# Patient Record
Sex: MALE | Race: WHITE
[De-identification: names, ages, dates, MRNs, and addresses within clinical notes are randomized per-mention and may not be internally consistent; named-entity substitution may affect disease eponyms.]

---

## 2020-04-12 ENCOUNTER — HOSPITAL ENCOUNTER (INPATIENT)
Dept: HOSPITAL 47 - EC | Age: 60
LOS: 3 days | Discharge: HOME | DRG: 871 | End: 2020-04-15
Attending: INTERNAL MEDICINE | Admitting: INTERNAL MEDICINE
Payer: COMMERCIAL

## 2020-04-12 VITALS — BODY MASS INDEX: 27.1 KG/M2

## 2020-04-12 DIAGNOSIS — C85.99: ICD-10-CM

## 2020-04-12 DIAGNOSIS — N20.0: ICD-10-CM

## 2020-04-12 DIAGNOSIS — E87.1: ICD-10-CM

## 2020-04-12 DIAGNOSIS — N39.0: ICD-10-CM

## 2020-04-12 DIAGNOSIS — Z87.891: ICD-10-CM

## 2020-04-12 DIAGNOSIS — Z16.29: ICD-10-CM

## 2020-04-12 DIAGNOSIS — Z20.828: ICD-10-CM

## 2020-04-12 DIAGNOSIS — K83.09: ICD-10-CM

## 2020-04-12 DIAGNOSIS — Z85.841: ICD-10-CM

## 2020-04-12 DIAGNOSIS — E78.5: ICD-10-CM

## 2020-04-12 DIAGNOSIS — K71.6: ICD-10-CM

## 2020-04-12 DIAGNOSIS — E11.65: ICD-10-CM

## 2020-04-12 DIAGNOSIS — G93.6: ICD-10-CM

## 2020-04-12 DIAGNOSIS — E83.42: ICD-10-CM

## 2020-04-12 DIAGNOSIS — K21.9: ICD-10-CM

## 2020-04-12 DIAGNOSIS — A41.9: Primary | ICD-10-CM

## 2020-04-12 DIAGNOSIS — E87.2: ICD-10-CM

## 2020-04-12 DIAGNOSIS — T50.905A: ICD-10-CM

## 2020-04-12 DIAGNOSIS — B95.62: ICD-10-CM

## 2020-04-12 DIAGNOSIS — Z79.899: ICD-10-CM

## 2020-04-12 DIAGNOSIS — I10: ICD-10-CM

## 2020-04-12 DIAGNOSIS — Z92.21: ICD-10-CM

## 2020-04-12 LAB
ALBUMIN SERPL-MCNC: 3.4 G/DL (ref 3.5–5)
ALP SERPL-CCNC: 417 U/L (ref 38–126)
ALT SERPL-CCNC: 1056 U/L (ref 4–49)
ANION GAP SERPL CALC-SCNC: 7 MMOL/L
APTT BLD: 20.9 SEC (ref 22–30)
AST SERPL-CCNC: 505 U/L (ref 17–59)
BUN SERPL-SCNC: 13 MG/DL (ref 9–20)
CALCIUM SPEC-MCNC: 9.5 MG/DL (ref 8.4–10.2)
CELLS COUNTED: 200
CHLORIDE SERPL-SCNC: 97 MMOL/L (ref 98–107)
CO2 SERPL-SCNC: 25 MMOL/L (ref 22–30)
D DIMER PPP FEU-MCNC: 1.46 MG/L FEU (ref ?–0.6)
EOSINOPHIL # BLD MANUAL: 0.07 K/UL (ref 0–0.7)
ERYTHROCYTE [DISTWIDTH] IN BLOOD BY AUTOMATED COUNT: 4.2 M/UL (ref 4.3–5.9)
ERYTHROCYTE [DISTWIDTH] IN BLOOD: 13.5 % (ref 11.5–15.5)
GLUCOSE BLD-MCNC: 301 MG/DL (ref 75–99)
GLUCOSE SERPL-MCNC: 315 MG/DL (ref 74–99)
HCT VFR BLD AUTO: 39.7 % (ref 39–53)
HEPATITIS A ANTIBODY IGM: NEGATIVE
HGB BLD-MCNC: 13.3 GM/DL (ref 13–17.5)
INR PPP: 0.9 (ref ?–1.2)
LDH SPEC-CCNC: 1554 U/L (ref 313–618)
LYMPHOCYTES # BLD MANUAL: 2.41 K/UL (ref 1–4.8)
MAGNESIUM SPEC-SCNC: 1.5 MG/DL (ref 1.6–2.3)
MCH RBC QN AUTO: 31.6 PG (ref 25–35)
MCHC RBC AUTO-ENTMCNC: 33.5 G/DL (ref 31–37)
MCV RBC AUTO: 94.4 FL (ref 80–100)
METAMYELOCYTES # BLD: 0.26 K/UL
MONOCYTES # BLD MANUAL: 0.78 K/UL (ref 0–1)
MYELOCYTES # BLD MANUAL: 0.2 K/UL
NEUTROPHILS NFR BLD MANUAL: 42 %
NEUTS SEG # BLD MANUAL: 2.9 K/UL (ref 1.3–7.7)
PLATELET # BLD AUTO: 344 K/UL (ref 150–450)
POTASSIUM SERPL-SCNC: 4 MMOL/L (ref 3.5–5.1)
PROT SERPL-MCNC: 6.2 G/DL (ref 6.3–8.2)
PT BLD: 9.3 SEC (ref 9–12)
SODIUM SERPL-SCNC: 129 MMOL/L (ref 137–145)
WBC # BLD AUTO: 6.5 K/UL (ref 3.8–10.6)

## 2020-04-12 PROCEDURE — 87635 SARS-COV-2 COVID-19 AMP PRB: CPT

## 2020-04-12 PROCEDURE — 80053 COMPREHEN METABOLIC PANEL: CPT

## 2020-04-12 PROCEDURE — 87040 BLOOD CULTURE FOR BACTERIA: CPT

## 2020-04-12 PROCEDURE — 80074 ACUTE HEPATITIS PANEL: CPT

## 2020-04-12 PROCEDURE — 93005 ELECTROCARDIOGRAM TRACING: CPT

## 2020-04-12 PROCEDURE — 81001 URINALYSIS AUTO W/SCOPE: CPT

## 2020-04-12 PROCEDURE — 85610 PROTHROMBIN TIME: CPT

## 2020-04-12 PROCEDURE — 87077 CULTURE AEROBIC IDENTIFY: CPT

## 2020-04-12 PROCEDURE — 96365 THER/PROPH/DIAG IV INF INIT: CPT

## 2020-04-12 PROCEDURE — 86140 C-REACTIVE PROTEIN: CPT

## 2020-04-12 PROCEDURE — 36415 COLL VENOUS BLD VENIPUNCTURE: CPT

## 2020-04-12 PROCEDURE — 82150 ASSAY OF AMYLASE: CPT

## 2020-04-12 PROCEDURE — 71045 X-RAY EXAM CHEST 1 VIEW: CPT

## 2020-04-12 PROCEDURE — 80299 QUANTITATIVE ASSAY DRUG: CPT

## 2020-04-12 PROCEDURE — 87086 URINE CULTURE/COLONY COUNT: CPT

## 2020-04-12 PROCEDURE — 82728 ASSAY OF FERRITIN: CPT

## 2020-04-12 PROCEDURE — 74176 CT ABD & PELVIS W/O CONTRAST: CPT

## 2020-04-12 PROCEDURE — 82009 KETONE BODYS QUAL: CPT

## 2020-04-12 PROCEDURE — 99285 EMERGENCY DEPT VISIT HI MDM: CPT

## 2020-04-12 PROCEDURE — 71275 CT ANGIOGRAPHY CHEST: CPT

## 2020-04-12 PROCEDURE — 76705 ECHO EXAM OF ABDOMEN: CPT

## 2020-04-12 PROCEDURE — 85730 THROMBOPLASTIN TIME PARTIAL: CPT

## 2020-04-12 PROCEDURE — 85379 FIBRIN DEGRADATION QUANT: CPT

## 2020-04-12 PROCEDURE — 84145 PROCALCITONIN (PCT): CPT

## 2020-04-12 PROCEDURE — 96361 HYDRATE IV INFUSION ADD-ON: CPT

## 2020-04-12 PROCEDURE — 86665 EPSTEIN-BARR CAPSID VCA: CPT

## 2020-04-12 PROCEDURE — 84550 ASSAY OF BLOOD/URIC ACID: CPT

## 2020-04-12 PROCEDURE — 70450 CT HEAD/BRAIN W/O DYE: CPT

## 2020-04-12 PROCEDURE — 83690 ASSAY OF LIPASE: CPT

## 2020-04-12 PROCEDURE — 93306 TTE W/DOPPLER COMPLETE: CPT

## 2020-04-12 PROCEDURE — 85025 COMPLETE CBC W/AUTO DIFF WBC: CPT

## 2020-04-12 PROCEDURE — 84484 ASSAY OF TROPONIN QUANT: CPT

## 2020-04-12 PROCEDURE — 87502 INFLUENZA DNA AMP PROBE: CPT

## 2020-04-12 PROCEDURE — 86645 CMV ANTIBODY IGM: CPT

## 2020-04-12 PROCEDURE — 87186 SC STD MICRODIL/AGAR DIL: CPT

## 2020-04-12 PROCEDURE — 83615 LACTATE (LD) (LDH) ENZYME: CPT

## 2020-04-12 PROCEDURE — 83735 ASSAY OF MAGNESIUM: CPT

## 2020-04-12 PROCEDURE — 83605 ASSAY OF LACTIC ACID: CPT

## 2020-04-12 RX ADMIN — INSULIN ASPART SCH UNIT: 100 INJECTION, SOLUTION INTRAVENOUS; SUBCUTANEOUS at 20:59

## 2020-04-12 RX ADMIN — DEXAMETHASONE SCH MG: 4 TABLET ORAL at 19:52

## 2020-04-12 RX ADMIN — FLUTICASONE PROPIONATE PRN SPRAY: 50 SPRAY, METERED NASAL at 21:42

## 2020-04-12 NOTE — P.CONS
History of Present Illness





- Reason for Consult


Consult date: 04/12/20


NHL


Requesting physician: Yung More





- Chief Complaint


Labile, Weakness, Fever





- History of Present Illness





Mr. Pires is a pleasant male who was recently admitted to [facility] 

with mental status changes, weakness, and inability to speak. He was found to 

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 

with Primary CNS Lymphoma. I did attempt to speak with Delano today although 

pleasant he is a poor historian and unable to tell me what was going on, why he 

was in hospital, and what events had occurred over the past few months. 

THerefore I spoke in depth with his wife Drea. On admission low grade fevers, 

current pan cultures are negative. He apparently was treated with HIgh Dose 

Methotrexate, and is due for cycle number two on 4/15/20. He was suppose to meet

Dr. Bryant as outpatient this week to resume care closer to home. On admission 

his Liver function is increased. CTA negative for PE. 





Review of Systems





A 14 point review of systems assessed and completed and all negative except HPI 

- Patient poor historian most of this information was gathered from wife





Past Medical History


Past Medical History: Cancer, Diabetes Mellitus


Additional Past Medical History / Comment(s): BRAIN CANCER


History of Any Multi-Drug Resistant Organisms: None Reported


Additional Past Surgical History / Comment(s): PORT


Past Psychological History: No Psychological Hx Reported


Smoking Status: Former smoker


Past Alcohol Use History: Occasional


Past Drug Use History: None Reported





- Past Family History


  ** Father


Additional Family Medical History / Comment(s): unknown





  ** Mother


Additional Family Medical History / Comment(s): unknown





Medications and Allergies


                                Home Medications











 Medication  Instructions  Recorded  Confirmed  Type


 


Allopurinol [Zyloprim] 300 mg PO DAILY 04/12/20 04/12/20 History


 


Citalopram Hydrobromide [CeleXA] 20 mg PO DAILY 04/12/20 04/12/20 History


 


Dexamethasone 4 mg PO DAILY 04/12/20 04/12/20 History


 


Docusate Sodium [Dok] 100 mg PO BID 04/12/20 04/12/20 History


 


Lacosamide [Vimpat] 100 mg PO BID 04/12/20 04/12/20 History


 


QUEtiapine [SEROquel] 50 mg PO HS 04/12/20 04/12/20 History








                                    Allergies











Allergy/AdvReac Type Severity Reaction Status Date / Time


 


No Known Allergies Allergy   Verified 04/12/20 12:33














Physical Exam


Vitals: 


                                   Vital Signs











  Temp Pulse Resp BP Pulse Ox


 


 04/12/20 14:48  98.8 F  71  16  134/74  96


 


 04/12/20 13:00   80  16  133/71  96


 


 04/12/20 11:00   70  18  123/61  95


 


 04/12/20 10:08  99.2 F  65  18  120/63  97


 


 04/12/20 08:40   72  18  151/80  98


 


 04/12/20 07:57  100.2 F H  74  18  141/80  97








                                Intake and Output











 04/12/20 04/12/20 04/12/20





 06:59 14:59 22:59


 


Other:   


 


  Weight  99.79 kg 














Telemed visit, patient seen on RN facetime although pleasant unable to provide 

info





Results


CBC & Chem 7: 


                                 04/12/20 08:25





                                 04/12/20 08:25


Labs: 


                  Abnormal Lab Results - Last 24 Hours (Table)











  04/12/20 04/12/20 04/12/20 Range/Units





  08:25 08:25 08:25 


 


RBC  4.20 L    (4.30-5.90)  m/uL


 


Metamyelocytes # (Man)  0.26 H    (0)  k/uL


 


Myelocytes # (Manual)  0.20 H    (0)  k/uL


 


Nucleated RBCs  1 H    (0-0)  /100 WBC


 


APTT   20.9 L   (22.0-30.0)  sec


 


D-Dimer   1.46 H   (<0.60)  mg/L FEU


 


Sodium    129 L  (137-145)  mmol/L


 


Chloride    97 L  ()  mmol/L


 


Glucose    315 H  (74-99)  mg/dL


 


Plasma Lactic Acid Chauncey     (0.7-2.0)  mmol/L


 


Magnesium    1.5 L  (1.6-2.3)  mg/dL


 


AST    505 H  (17-59)  U/L


 


ALT    1056 H  (4-49)  U/L


 


Alkaline Phosphatase    417 H  ()  U/L


 


Lactate Dehydrogenase    1554 H  (313-618)  U/L


 


C-Reactive Protein    28.2 H  (<10.0)  mg/L


 


Total Protein    6.2 L  (6.3-8.2)  g/dL


 


Albumin    3.4 L  (3.5-5.0)  g/dL














  04/12/20 04/12/20 Range/Units





  08:25 13:07 


 


RBC    (4.30-5.90)  m/uL


 


Metamyelocytes # (Man)    (0)  k/uL


 


Myelocytes # (Manual)    (0)  k/uL


 


Nucleated RBCs    (0-0)  /100 WBC


 


APTT    (22.0-30.0)  sec


 


D-Dimer    (<0.60)  mg/L FEU


 


Sodium    (137-145)  mmol/L


 


Chloride    ()  mmol/L


 


Glucose    (74-99)  mg/dL


 


Plasma Lactic Acid Chauncey  3.1 H*  2.4 H*  (0.7-2.0)  mmol/L


 


Magnesium    (1.6-2.3)  mg/dL


 


AST    (17-59)  U/L


 


ALT    (4-49)  U/L


 


Alkaline Phosphatase    ()  U/L


 


Lactate Dehydrogenase    (313-618)  U/L


 


C-Reactive Protein    (<10.0)  mg/L


 


Total Protein    (6.3-8.2)  g/dL


 


Albumin    (3.5-5.0)  g/dL











Chest x-ray: report reviewed


CT scan - chest: report reviewed





Assessment and Plan


Plan: 





Assessment and Recommendations:





Recent Diagnosis of CNS Lymphoma:


 - Status POst one cycle of chemotherapy at [facility] and apparently due

 on 4/15/20. Was to have follow-up with Dr. Bryant this week to resume care 

closer to home. Information provided from eric Kuo, possible treatment was 

High DOse Methotrexate (?) will request records


 - Check Methotrexate level





Fevers:


 - Work-up in progress


 - Low grade in Emergency, possible related to tumor and chemo, must rule out 

infectious nature first





Elevated LFTs:


 - Medication versus other


 - Imaging of liver ordered





Telemed visit today, most of time spent collecting information from wife drea.

 Visit was given with permission from patient, history, medications, pertinent 

labs and orderes reviewed and placed.  


Time with Patient: Greater than 30

## 2020-04-12 NOTE — P.PN
Progress Note - Text


Progress Note Date: 04/12/20





Spoke to patient's wife, Shiela. She was able to clarify history and recent 

events better. Delano has a primary CNS lymphoma and was recently at [facility] where he received his first cycle of what is believed to be High Dose 

Methotrexate. His baseline recently is poor historian, labile emotions and 

intermittent confusion. He originally presented with quick onset inability to 

speak or walk, this is when the brain tumor was found. This was Early March per 

his wife. She denies any history of liver disease. No excessive history of 

alcohol, social per wife. Has not smoked since diagnosed. He was suppose to 

transfer care to Dr. Bryant as he is due for next round of chemotherapy on April 15th. 





Increased LFTs


Hyponatremia, Hypomagnesia


Increased Temp: Tumor versus infectious





Pan CUlture


Abdominal imaging focus liver


Methotrexate level stat


Uric acid and Phos levels





Monitor for tumor lysis and work-up of increased liver function. 





Full consult to follow





Marisel Perkins NP

## 2020-04-12 NOTE — ED
General Adult HPI





- General


Chief complaint: Fever


Stated complaint: Weakness/Poss Covid


Time Seen by Provider: 04/12/20 08:00


Source: patient, RN notes reviewed, old records reviewed


Mode of arrival: EMS





- History of Present Illness


Initial comments: 





This is a 60-year-old male who presents to the emergency department and is a 

very poor historian but he is alert and oriented 3.  Patient states he was in 

the hospital at [facility] about 2 months ago he thinks he was in the 

hospital for 6 weeks but he is not exactly sure why he was in the hospital or 

what symptoms brought him to the hospital or what his final diagnosis was other 

then he was diagnosed with non-Hodgkin's lymphoma.  Patient states nobody will 

tell him the truth his brothers or girlfriend for some reason or keeping the 

information from him so he will not tell me why.  Patient states he's here today

because he started having some achiness just a low-grade fever and he decided 

come to the emergency department to be evaluated.  Patient states he is supposed

to be treated locally for his non-Hodgkin's lymphoma and has an appointment 

tomorrow.  Patient states he's had no shortness of breath or difficulty 

breathing easily no conjunctival symptoms.  Patient denies any chest pain or 

palpitations.  Patient denies abdominal pain patient denies any nausea vomiting 

diarrhea per patient denies any rashes.  Patient again does not remember 

virtually anything from his 6 week hospital stay at home on [place].





- Related Data


                                Home Medications











 Medication  Instructions  Recorded  Confirmed


 


Allopurinol [Zyloprim] 300 mg PO DAILY 04/12/20 04/12/20


 


Citalopram Hydrobromide [CeleXA] 20 mg PO DAILY 04/12/20 04/12/20


 


Dexamethasone 4 mg PO DAILY 04/12/20 04/12/20


 


Docusate Sodium [Dok] 100 mg PO BID 04/12/20 04/12/20


 


Lacosamide [Vimpat] 100 mg PO BID 04/12/20 04/12/20


 


QUEtiapine [SEROquel] 50 mg PO HS 04/12/20 04/12/20











                                    Allergies











Allergy/AdvReac Type Severity Reaction Status Date / Time


 


No Known Allergies Allergy   Verified 04/12/20 12:33














Review of Systems


ROS Statement: 


Those systems with pertinent positive or pertinent negative responses have been 

documented in the HPI.





ROS Other: All systems not noted in ROS Statement are negative.





Past Medical History


Past Medical History: Cancer, Diabetes Mellitus


Additional Past Medical History / Comment(s): BRAIN CANCER


History of Any Multi-Drug Resistant Organisms: None Reported


Additional Past Surgical History / Comment(s): PORT


Past Psychological History: No Psychological Hx Reported


Smoking Status: Former smoker


Past Alcohol Use History: Occasional


Past Drug Use History: None Reported





General Exam





- General Exam Comments


Initial Comments: 





GENERAL:


Patient is well-developed and well-nourished.  Patient is nontoxic and well-

hydrated and is in no apparent distress.





ENT:


Neck is soft and supple.  No significant lymphadenopathy is noted.  Oropharynx 

is clear.  Moist mucous membranes.  Neck has full range of motion without 

eliciting any pain.  





EYES:


The sclera were anicteric and conjunctiva were pink and moist.  Extraocular 

movements were intact and pupils were equal round and reactive to light.  

Eyelids were unremarkable.





PULMONARY:


Patient had some minimal crackles in the left base





CARDIOVASCULAR:


There is a regular rate and rhythm without any murmurs gallops or rubs. 





ABDOMEN:


Soft and nontender with normal bowel sounds.   





SKIN:


Skin is clear with no lesions or rashes and otherwise unremarkable.





NEUROLOGIC:


Patient is alert and oriented x3.  Cranial nerves II through XII are grossly 

intact.  Motor and sensory are also intact.  Normal speech, volume and content. 

 Symmetrical smile.  





MUSCULOSKELETAL:


Normal extremities with adequate strength and full range of motion. 





LYMPHATICS:


No significant lymphadenopathy is noted





PSYCHIATRIC:


Normal psychiatric evaluation.  


Limitations: no limitations





Course


                                   Vital Signs











  04/12/20 04/12/20 04/12/20





  07:57 08:40 10:08


 


Temperature 100.2 F H  99.2 F


 


Pulse Rate 74 72 65


 


Respiratory 18 18 18





Rate   


 


Blood Pressure 141/80 151/80 120/63


 


O2 Sat by Pulse 97 98 97





Oximetry   














  04/12/20 04/12/20





  11:00 13:00


 


Temperature  


 


Pulse Rate 70 80


 


Respiratory 18 16





Rate  


 


Blood Pressure 123/61 133/71


 


O2 Sat by Pulse 95 96





Oximetry  














Medical Decision Making





- Medical Decision Making





EKG shows normal sinus rhythm at 74 bpm ME interval 162 QRS is 90 QT interval 38

 QTC is 4:30.  Patient's EKG shows no ST segment elevation or depression. 





Patient was given magnesium emergency department.  Patient was also given 

fluids.





I spoke with Dr. Schneider agrees to admit the patient I wrote admitting orders.





- Lab Data


Result diagrams: 


                                 04/12/20 08:25





                                 04/12/20 08:25


                                   Lab Results











  04/12/20 04/12/20 04/12/20 Range/Units





  08:18 08:25 08:25 


 


WBC   6.5   (3.8-10.6)  k/uL


 


RBC   4.20 L   (4.30-5.90)  m/uL


 


Hgb   13.3   (13.0-17.5)  gm/dL


 


Hct   39.7   (39.0-53.0)  %


 


MCV   94.4   (80.0-100.0)  fL


 


MCH   31.6   (25.0-35.0)  pg


 


MCHC   33.5   (31.0-37.0)  g/dL


 


RDW   13.5   (11.5-15.5)  %


 


Plt Count   344   (150-450)  k/uL


 


Neutrophils % (Manual)   42   %


 


Band Neutrophils %   3   %


 


Lymphocytes % (Manual)   37   %


 


Monocytes % (Manual)   12   %


 


Eosinophils % (Manual)   1   %


 


Metamyelocytes %   4   %


 


Myelocytes %   3   %


 


Neutrophils # (Manual)   2.90   (1.3-7.7)  k/uL


 


Lymphocytes # (Manual)   2.41   (1.0-4.8)  k/uL


 


Monocytes # (Manual)   0.78   (0-1.0)  k/uL


 


Eosinophils # (Manual)   0.07   (0-0.7)  k/uL


 


Metamyelocytes # (Man)   0.26 H   (0)  k/uL


 


Myelocytes # (Manual)   0.20 H   (0)  k/uL


 


Nucleated RBCs   1 H   (0-0)  /100 WBC


 


Manual Slide Review   Performed   


 


PT    9.3  (9.0-12.0)  sec


 


INR    0.9  (<1.2)  


 


APTT    20.9 L  (22.0-30.0)  sec


 


D-Dimer    1.46 H  (<0.60)  mg/L FEU


 


Sodium     (137-145)  mmol/L


 


Potassium     (3.5-5.1)  mmol/L


 


Chloride     ()  mmol/L


 


Carbon Dioxide     (22-30)  mmol/L


 


Anion Gap     mmol/L


 


BUN     (9-20)  mg/dL


 


Creatinine     (0.66-1.25)  mg/dL


 


Est GFR (CKD-EPI)AfAm     (>60 ml/min/1.73 sqM)  


 


Est GFR (CKD-EPI)NonAf     (>60 ml/min/1.73 sqM)  


 


Glucose     (74-99)  mg/dL


 


Lactic Ac Sepsis Rflx     


 


Plasma Lactic Acid Chauncey     (0.7-2.0)  mmol/L


 


Calcium     (8.4-10.2)  mg/dL


 


Magnesium     (1.6-2.3)  mg/dL


 


Total Bilirubin     (0.2-1.3)  mg/dL


 


AST     (17-59)  U/L


 


ALT     (4-49)  U/L


 


Alkaline Phosphatase     ()  U/L


 


Lactate Dehydrogenase     (313-618)  U/L


 


C-Reactive Protein     (<10.0)  mg/L


 


Total Protein     (6.3-8.2)  g/dL


 


Albumin     (3.5-5.0)  g/dL


 


Coronavirus (PCR)  Not Detected    (Not Detectd)  


 


Hepatitis A IgM Ab     


 


Influenza Type A RNA  Not Detected    (Not Detectd)  


 


Influenza Type B (PCR)  Not Detected    (Not Detectd)  














  04/12/20 04/12/20 04/12/20 Range/Units





  08:25 08:25 09:07 


 


WBC     (3.8-10.6)  k/uL


 


RBC     (4.30-5.90)  m/uL


 


Hgb     (13.0-17.5)  gm/dL


 


Hct     (39.0-53.0)  %


 


MCV     (80.0-100.0)  fL


 


MCH     (25.0-35.0)  pg


 


MCHC     (31.0-37.0)  g/dL


 


RDW     (11.5-15.5)  %


 


Plt Count     (150-450)  k/uL


 


Neutrophils % (Manual)     %


 


Band Neutrophils %     %


 


Lymphocytes % (Manual)     %


 


Monocytes % (Manual)     %


 


Eosinophils % (Manual)     %


 


Metamyelocytes %     %


 


Myelocytes %     %


 


Neutrophils # (Manual)     (1.3-7.7)  k/uL


 


Lymphocytes # (Manual)     (1.0-4.8)  k/uL


 


Monocytes # (Manual)     (0-1.0)  k/uL


 


Eosinophils # (Manual)     (0-0.7)  k/uL


 


Metamyelocytes # (Man)     (0)  k/uL


 


Myelocytes # (Manual)     (0)  k/uL


 


Nucleated RBCs     (0-0)  /100 WBC


 


Manual Slide Review     


 


PT     (9.0-12.0)  sec


 


INR     (<1.2)  


 


APTT     (22.0-30.0)  sec


 


D-Dimer     (<0.60)  mg/L FEU


 


Sodium  129 L    (137-145)  mmol/L


 


Potassium  4.0    (3.5-5.1)  mmol/L


 


Chloride  97 L    ()  mmol/L


 


Carbon Dioxide  25    (22-30)  mmol/L


 


Anion Gap  7    mmol/L


 


BUN  13    (9-20)  mg/dL


 


Creatinine  0.79    (0.66-1.25)  mg/dL


 


Est GFR (CKD-EPI)AfAm  >90    (>60 ml/min/1.73 sqM)  


 


Est GFR (CKD-EPI)NonAf  >90    (>60 ml/min/1.73 sqM)  


 


Glucose  315 H    (74-99)  mg/dL


 


Lactic Ac Sepsis Rflx    Y  


 


Plasma Lactic Acid Chauncey   3.1 H*   (0.7-2.0)  mmol/L


 


Calcium  9.5    (8.4-10.2)  mg/dL


 


Magnesium  1.5 L    (1.6-2.3)  mg/dL


 


Total Bilirubin  0.3    (0.2-1.3)  mg/dL


 


AST  505 H    (17-59)  U/L


 


ALT  1056 H    (4-49)  U/L


 


Alkaline Phosphatase  417 H    ()  U/L


 


Lactate Dehydrogenase  1554 H    (313-618)  U/L


 


C-Reactive Protein  28.2 H    (<10.0)  mg/L


 


Total Protein  6.2 L    (6.3-8.2)  g/dL


 


Albumin  3.4 L    (3.5-5.0)  g/dL


 


Coronavirus (PCR)     (Not Detectd)  


 


Hepatitis A IgM Ab     


 


Influenza Type A RNA     (Not Detectd)  


 


Influenza Type B (PCR)     (Not Detectd)  














  04/12/20 04/12/20 Range/Units





  12:12 13:07 


 


WBC    (3.8-10.6)  k/uL


 


RBC    (4.30-5.90)  m/uL


 


Hgb    (13.0-17.5)  gm/dL


 


Hct    (39.0-53.0)  %


 


MCV    (80.0-100.0)  fL


 


MCH    (25.0-35.0)  pg


 


MCHC    (31.0-37.0)  g/dL


 


RDW    (11.5-15.5)  %


 


Plt Count    (150-450)  k/uL


 


Neutrophils % (Manual)    %


 


Band Neutrophils %    %


 


Lymphocytes % (Manual)    %


 


Monocytes % (Manual)    %


 


Eosinophils % (Manual)    %


 


Metamyelocytes %    %


 


Myelocytes %    %


 


Neutrophils # (Manual)    (1.3-7.7)  k/uL


 


Lymphocytes # (Manual)    (1.0-4.8)  k/uL


 


Monocytes # (Manual)    (0-1.0)  k/uL


 


Eosinophils # (Manual)    (0-0.7)  k/uL


 


Metamyelocytes # (Man)    (0)  k/uL


 


Myelocytes # (Manual)    (0)  k/uL


 


Nucleated RBCs    (0-0)  /100 WBC


 


Manual Slide Review    


 


PT    (9.0-12.0)  sec


 


INR    (<1.2)  


 


APTT    (22.0-30.0)  sec


 


D-Dimer    (<0.60)  mg/L FEU


 


Sodium    (137-145)  mmol/L


 


Potassium    (3.5-5.1)  mmol/L


 


Chloride    ()  mmol/L


 


Carbon Dioxide    (22-30)  mmol/L


 


Anion Gap    mmol/L


 


BUN    (9-20)  mg/dL


 


Creatinine    (0.66-1.25)  mg/dL


 


Est GFR (CKD-EPI)AfAm    (>60 ml/min/1.73 sqM)  


 


Est GFR (CKD-EPI)NonAf    (>60 ml/min/1.73 sqM)  


 


Glucose    (74-99)  mg/dL


 


Lactic Ac Sepsis Rflx    


 


Plasma Lactic Acid Chauncey   2.4 H*  (0.7-2.0)  mmol/L


 


Calcium    (8.4-10.2)  mg/dL


 


Magnesium    (1.6-2.3)  mg/dL


 


Total Bilirubin    (0.2-1.3)  mg/dL


 


AST    (17-59)  U/L


 


ALT    (4-49)  U/L


 


Alkaline Phosphatase    ()  U/L


 


Lactate Dehydrogenase    (313-618)  U/L


 


C-Reactive Protein    (<10.0)  mg/L


 


Total Protein    (6.3-8.2)  g/dL


 


Albumin    (3.5-5.0)  g/dL


 


Coronavirus (PCR)    (Not Detectd)  


 


Hepatitis A IgM Ab  NEGATIVE   


 


Influenza Type A RNA    (Not Detectd)  


 


Influenza Type B (PCR)    (Not Detectd)  














Disposition


Clinical Impression: 


 Fever of unknown origin, Hyponatremia, Hypomagnesemia, Lactic acidosis, 

Elevated liver enzymes





Disposition: ADMITTED AS IP TO THIS HOSP


Referrals: 


Nonstaff,Physician [Primary Care Provider] - 1-2 days


Time of Disposition: 14:37

## 2020-04-12 NOTE — HP
HISTORY AND PHYSICAL



DATE OF SERVICE:

04/12/2020.



CHIEF COMPLAINT:

Fever.



HISTORY OF PRESENT ILLNESS:

This 60-year-old gentleman who was recently diagnosed with non-Hodgkin lymphoma on the

right side of the brain at [facility] after biopsy, also had significant

difficulty with the left arm.  The patient had CNS lymphoma and the patient was started

apparently on chemotherapy.  The patient was running a mild fever and the patient was

taken to [facility] and was admitted for further evaluation and treatment.

Prior to the initial episode of lymphoma, [place]

subsequently appeared in [place], not able to remember.  Patient apparently does not

remember what happened for the last couple months.  The fever was low-grade and the

patient was recently discharged from [facility] and occasional cough is

reported.  Otherwise, no shortness of breath, no chest pain or palpitations.  Patient

came to [facility] and was admitted for further evaluation and treatment.  The

evaluation in the ER showed multiple abnormalities including normal CBC, but however, D-

dimer was elevated 1.4.  Sodium 129.  Lactic acid also elevated and AST/ALT was

significantly elevated at 506 and 1056 indicating hepatitis inflammatory process.  C-

reactive protein also increased.  Corona virus testing was negative.  Hepatitis panel

is also negative.  The chest CTA was also done because of the elevated D-dimer, which

showed no evidence of any pulmonary embolism, DJD of the spine was noted.  A detailed

history could not be taken from the patient because of memory gaps.  Most of the

history is taken from my discussion with staff who discussed with the wife at length.



PAST MEDICAL HISTORY:

Past medical history of recently diagnosed CNS lymphoma and brain biopsy, history of

diabetes type 2, GERD, hypertension, history of [employer].



MEDICATIONS:

Prior to admission:

1. Vimpat 100 mg p.o. b.i.d.

2. Docusate sodium 100 mg p.o. b.i.d.

3. Seroquel 50 mg q.h.s.

4. Zyloprim 300 mg daily.

5. Dexamethasone 4 mg p.o. daily.

6. Celexa 20 mg daily.



ALLERGIES:

None.



FAMILY HISTORY:

Family history unknown.



SOCIAL HISTORY:

Previous history of smoking.  No history of current smoking or alcohol intake.



REVIEW OF SYSTEMS:

ENT: No diminished vision. No diminished hearing.

CARDIOVASCULAR no angina or palpitations.

RESPIRATION : As mentioned earlier.

GI no nausea or vomiting.

 no dysuria.

NERVOUS SYSTEM: As mentioned earlier.

ALLERGIES/IMMUNOLOGY: No asthma or hayfever.

MUSCULOSKELETAL as mentioned earlier.

HEMATOLOGY/ONCOLOGY: As mentioned earlier.

ENDOCRINE:  No history of diabetes or hypothyroidism.

CONSTITUTIONAL: As mentioned earlier.

DERMATOLOGY: Negative.

RHEUMATOLOGY: Negative.

PSYCHIATRIC: As mentioned earlier.



PHYSICAL EXAMINATION:

Alert and oriented x2.  Pulse 74. Blood pressure 113/58, respiration 18, temp 98.8,

pulse ox 98% on room air.

HEENT is conjunctivae normal.  Oral mucosa moist.

NECK is no jugular venous distention.  No carotid bruit. No lymph node enlargement.

CARDIOVASCULAR system:  S1, S2 muffled. No S3, no S4.

RESPIRATORY: Breath sounds diminished in the bases.  No rhonchi.  No crackles.

ABDOMEN:  Soft, nontender.  No mass palpable.

LEGS no edema.  No swelling.

NERVOUS SYSTEM: Higher functions as mentioned earlier. Moves all four limbs. No focal

motor or sensory deficits.

LYMPHATICS: No lymph nodes palpable in the neck, axillae or groin.

SKIN: No ulcer, rash or bleeding.

JOINTS:  No active deforming arthropathy.



LABS:

WBC 6.2, hemoglobin 13.3.  D-dimer is 1.46.  Sodium 129, otherwise AST is 505, ALT is

1056.



ASSESSMENT:

1. Fever for evaluation, rule out sepsis.

2. Increased AST/ALT possibly acute hepatitis.

3. Elevated alkaline phosphatase and LDH.

4. Elevated plasma lactic acid.

5. Hypomagnesemia.

6. Diabetes mellitus type 2 uncontrolled, possibly new onset secondary to steroids.

7. Hyponatremia.

8. Elevated D-dimer without any evidence of pulmonary embolism.

9. History of recently diagnosed CNS lymphoma.

10.Gastroesophageal reflux disease.

11.Hyperlipidemia.

12.History of nicotine dependence.



RECOMMENDATIONS AND DISCUSSION:

This 60-year-old gentleman who presented with multiple complex medical issues, we will

monitor the patient closely.  Continue the current medications, management and

symptomatic treatment.  I recommend empiric antibiotics.  Infectious disease

evaluation.  Cultures.  Covid test is negative.  However, we will monitor the patient

closely and repeat LFTs.  I would also recommend infectious disease evaluation.

Monitor blood sugars closely.  Serum acetone is positive.  Follow the DKA protocol.

Prognosis guarded because of multiple complex medical issues.  Closely follow with

Hematology/oncology also.





MMODL / IJN: [id number] / Job#: [id number]

## 2020-04-12 NOTE — XR
EXAMINATION TYPE: XR chest 1V portable

 

DATE OF EXAM: 4/12/2020

 

HISTORY: Suspected COVID-19 pneumonia.

 

REFERENCE: NONE.

 

FINDINGS: There is a Port-A-Cath in place on the right. Its tip is in the superior vena cava.

 

Lungs clear. Pleural space are clear. Heart size upper limits of normal.

 

IMPRESSION: 

 

NO ACUTE INTRATHORACIC DISEASE.

## 2020-04-12 NOTE — CT
EXAMINATION TYPE: CT chest angio for PE

 

DATE OF EXAM: 4/12/2020

 

COMPARISON: None.

 

HISTORY: SOB, possible PE

 

CT DLP: 454.9 mGycm

Automated exposure control for dose reduction was used.

 

CONTRAST: 

CT Chest for pulmonary embolism performed with with IV Contrast, patient injected with 100 mL of Isov
ue 300.

 

FINDINGS: There is some minimal relaxation atelectasis in the dependent portions of the lungs. There 
is some scarring or atelectasis in the left lingula. Lungs otherwise clear.

 

There is no significant axillary, mediastinal or hilar adenopathy.

 

There is no evidence of pulmonary embolus.

 

The aorta is normal in caliber without evidence of dissection. There is no pleural or pericardial flu
id.

 

Visualized portions of the upper abdomen are unremarkable.

 

There is hypertrophic spondylosis and spondylosis deformans within the dorsal spine.

 

IMPRESSION:

1. THIS EXAMINATION IS NEGATIVE FOR PULMONARY EMBOLUS.

2. DEGENERATIVE CHANGE WITHIN THE SPINE.

## 2020-04-13 LAB
ALBUMIN SERPL-MCNC: 3.6 G/DL (ref 3.5–5)
ALP SERPL-CCNC: 308 U/L (ref 38–126)
ALT SERPL-CCNC: 984 U/L (ref 4–49)
AMYLASE SERPL-CCNC: 49 U/L (ref 30–110)
ANION GAP SERPL CALC-SCNC: 9 MMOL/L
AST SERPL-CCNC: 251 U/L (ref 17–59)
BASOPHILS # BLD AUTO: 0 K/UL (ref 0–0.2)
BASOPHILS NFR BLD AUTO: 0 %
BUN SERPL-SCNC: 14 MG/DL (ref 9–20)
CALCIUM SPEC-MCNC: 9.3 MG/DL (ref 8.4–10.2)
CHLORIDE SERPL-SCNC: 94 MMOL/L (ref 98–107)
CO2 SERPL-SCNC: 28 MMOL/L (ref 22–30)
EOSINOPHIL # BLD AUTO: 0 K/UL (ref 0–0.7)
EOSINOPHIL NFR BLD AUTO: 1 %
ERYTHROCYTE [DISTWIDTH] IN BLOOD BY AUTOMATED COUNT: 4.51 M/UL (ref 4.3–5.9)
ERYTHROCYTE [DISTWIDTH] IN BLOOD: 13.6 % (ref 11.5–15.5)
FERRITIN SERPL-MCNC: 804.1 NG/ML (ref 22–322)
GLUCOSE BLD-MCNC: 185 MG/DL (ref 75–99)
GLUCOSE BLD-MCNC: 217 MG/DL (ref 75–99)
GLUCOSE BLD-MCNC: 267 MG/DL (ref 75–99)
GLUCOSE BLD-MCNC: 298 MG/DL (ref 75–99)
GLUCOSE SERPL-MCNC: 296 MG/DL (ref 74–99)
GLUCOSE UR QL: (no result)
HCT VFR BLD AUTO: 43.4 % (ref 39–53)
HGB BLD-MCNC: 14.1 GM/DL (ref 13–17.5)
LYMPHOCYTES # SPEC AUTO: 1 K/UL (ref 1–4.8)
LYMPHOCYTES NFR SPEC AUTO: 15 %
MCH RBC QN AUTO: 31.3 PG (ref 25–35)
MCHC RBC AUTO-ENTMCNC: 32.5 G/DL (ref 31–37)
MCV RBC AUTO: 96.2 FL (ref 80–100)
MONOCYTES # BLD AUTO: 0.5 K/UL (ref 0–1)
MONOCYTES NFR BLD AUTO: 7 %
NEUTROPHILS # BLD AUTO: 5.2 K/UL (ref 1.3–7.7)
NEUTROPHILS NFR BLD AUTO: 76 %
PH UR: 6 [PH] (ref 5–8)
PLATELET # BLD AUTO: 397 K/UL (ref 150–450)
POTASSIUM SERPL-SCNC: 5.2 MMOL/L (ref 3.5–5.1)
PROT SERPL-MCNC: 6.5 G/DL (ref 6.3–8.2)
RBC UR QL: 2 /HPF (ref 0–5)
SODIUM SERPL-SCNC: 131 MMOL/L (ref 137–145)
SP GR UR: 1.01 (ref 1–1.03)
UROBILINOGEN UR QL STRIP: <2 MG/DL (ref ?–2)
WBC # BLD AUTO: 6.9 K/UL (ref 3.8–10.6)
WBC # UR AUTO: 26 /HPF (ref 0–5)

## 2020-04-13 RX ADMIN — FLUTICASONE PROPIONATE PRN SPRAY: 50 SPRAY, METERED NASAL at 08:14

## 2020-04-13 RX ADMIN — IOPAMIDOL PRN ML: 612 INJECTION, SOLUTION INTRAVENOUS at 16:27

## 2020-04-13 RX ADMIN — INSULIN ASPART SCH UNIT: 100 INJECTION, SOLUTION INTRAVENOUS; SUBCUTANEOUS at 17:44

## 2020-04-13 RX ADMIN — IOPAMIDOL PRN ML: 612 INJECTION, SOLUTION INTRAVENOUS at 17:25

## 2020-04-13 RX ADMIN — PANTOPRAZOLE SODIUM SCH MG: 40 INJECTION, POWDER, FOR SOLUTION INTRAVENOUS at 08:11

## 2020-04-13 RX ADMIN — INSULIN ASPART SCH UNIT: 100 INJECTION, SOLUTION INTRAVENOUS; SUBCUTANEOUS at 12:58

## 2020-04-13 RX ADMIN — DEXAMETHASONE SCH MG: 4 TABLET ORAL at 08:11

## 2020-04-13 RX ADMIN — INSULIN ASPART SCH UNIT: 100 INJECTION, SOLUTION INTRAVENOUS; SUBCUTANEOUS at 21:55

## 2020-04-13 RX ADMIN — INSULIN ASPART SCH UNIT: 100 INJECTION, SOLUTION INTRAVENOUS; SUBCUTANEOUS at 08:12

## 2020-04-13 NOTE — ECHOF
Referral Reason:hx HTN



MEASUREMENTS

--------

HEIGHT: 182.9 cm

WEIGHT: 99.8 kg

BP: 119/65

IVSd:   1.2 cm     (0.6 - 1.1)

LVIDd:   4.0 cm     (3.9 - 5.3)

LVPWd:   1.5 cm     (0.6 - 1.1)

IVSs:   1.5 cm

LVIDs:   2.2 cm

LVPWs:   2.2 cm

LAESV Index (A-L):   25.83 ml/m

Ao Diam:   3.1 cm     (2.0 - 3.7)

AV Cusp:   1.8 cm     (1.5 - 2.6)

MV EXCURSION:   27.007 mm     (> 18.000)

MV EF SLOPE:   82 mm/s     (70 - 150)

EPSS:   0.4 cm

MV E Anthony:   0.68 m/s

MV DecT:   260 ms

MV A Anthony:   0.76 m/s

MV E/A Ratio:   0.90 

RAP:   5.00 mmHg

RVSP:   22.12 mmHg







FINDINGS

--------

Sinus rhythm.

This was a technically adequate study.

The left ventricular size is normal.   There is mild concentric left ventricular hypertrophy.   Overa
ll left ventricular systolic function is normal with, an EF between 55 - 60 %.   The diastolic fillin
g pattern is normal for the age of the patient 11.26.

The right ventricle is normal in size.

Normal LA  size by volume 22+/-6 ml/m2.

The right atrium was not well visualized.

Interatrial and interventricular septum intact.

The aortic valve is trileaflet, and appears structurally normal. No aortic stenosis or regurgitation.


The mitral valve is normal.   There is trace mitral regurgitation.

Mild tricuspid regurgitation present.   There is no evidence of pulmonary hypertension.   The right v
entricular systolic pressure, as measured by Doppler, is 22.12mmHg.

There is no pulmonic regurgitation present.

The aortic root size is normal.

Normal inferior vena cava with normal inspiratory collapse consistent with estimated right atrial pre
ssure of  5 mmHg.

There is no pericardial effusion.



CONCLUSIONS

--------

1. There is mild concentric left ventricular hypertrophy.

2. Overall left ventricular systolic function is normal with, an EF between 55 - 60 %.

3. The diastolic filling pattern is normal for the age of the patient 11.26

4. Normal LA size by volume 22+/-6 ml/m2.

5. The aortic valve is trileaflet, and appears structurally normal. No aortic stenosis or regurgitati
on.

6. There is trace mitral regurgitation.

7. Mild tricuspid regurgitation present.

8. There is no evidence of pulmonary hypertension.





SONOGRAPHER: Bridget Stevenson RDCS

## 2020-04-13 NOTE — PN
PROGRESS NOTE



DATE OF SERVICE:

04/13/2020



REASON FOR FOLLOWUP:

Fever and a question of UTI.



INTERVAL HISTORY:

The patient is currently afebrile.  The patient overall is feeling much better.  Denies

having any headache.  No chest pain, shortness of breath or cough.  No abdominal pain.

No diarrhea.



PHYSICAL EXAMINATION:

Blood pressure 119/62 with a pulse of 66, temperature 97.8.  He is 94% on room air.

General description is an elderly male up in the room in no distress.

RESPIRATORY SYSTEM: Unlabored breathing. Clear to auscultation anteriorly.

HEART: S1, S2.  Regular rate and rhythm.

ABDOMEN: Soft. No tenderness.



LABS/IMAGING:

Hemoglobin 14.9, white count 6.8.  BUN of 14, creatinine 0.70.



CT of abdomen and pelvis did show some hepatomegaly, but no evidence of any abscess or

gallbladder disease.



DIAGNOSTIC IMPRESSION AND PLAN:

Patient with low-grade fever in this patient recently diagnosed with CNS lymphoma,

status post methotrexate. Did have elevated liver enzymes, though hepatitis panel has

been negative.  CT did not show any evidence of any mass or abscess.  The patient did

have mildly positive UA.  Overall improvement on Rocephin; that will be continued while

waiting for the culture to finalize. Continue supportive care.





MMODL / IJN: [id number] / Job#: [id number]

## 2020-04-13 NOTE — CONS
CONSULTATION



DATE OF SERVICE:

04/13/2020



REQUESTING PHYSICIAN:

Dr. Schneider.



REASON FOR CONSULTATION:

Elevated LFTs.



HISTORY OF PRESENT ILLNESS:

The patient is a 60-year-old white male diagnosed with CNS lymphoma approximately 2

months ago, but patient not very clear about this at [facility].  He underwent

craniotomy with biopsy and is undergoing treatment currently.  The patient states that

he was just discharged home from [facility] last Tuesday, but is not very clear

about all these details.  He was admitted to the hospital because of low-grade fever,

some altered mental status. As per the records from Oncology, the patient received high-

dose steroids and high-dose methotrexate a few weeks ago and is scheduled to repeat the

cycle in 2 days.  Currently, oncology is following the patient closely.



While in the hospital, the patient was noted to have elevated LFTs and hence we are

consulted in regards to this issue.  The patient does not recall having any history of

chronic liver disease.  He usually drinks alcohol on a social basis.  No history of

jaundice or hepatitis in the past.  He currently reports no abdominal pain.  No nausea,

vomiting.  No rectal bleeding or melena.



PAST MEDICAL HISTORY:

Diabetes mellitus, CNS lymphoma diagnosed 2 months ago.



MEDICATIONS:

At home:  Zyloprim, Celexa, Dexamethasone, Vimpat, Seroquel, docusate sodium.



ALLERGIES:

None.



SOCIAL HISTORY:

Former smoker.  Alcohol use on a social basis.



FAMILY HISTORY:

Father unremarkable.  Mother unremarkable.



REVIEW OF SYSTEMS:

CARDIOPULMONARY: He denies any chest pain, shortness of breath.

GENITOURINARY: No dysuria or hematuria.  MUSCULOSKELETAL he reports unremarkable.

SKIN unremarkable.

ENDOCRINE unremarkable.

PSYCHIATRIC unremarkable.  He was recently started on Vimpat and Seroquel but patient

does not recall as to how long he has been taking the medication.

CONSTITUTIONAL:  No chills or night sweats, but he had low-grade fever.



PHYSICAL EXAMINATION:

He appears comfortable.  No apparent distress.  Vital signs are stable.  Blood pressure

is 136/70, pulse 73, temperature 98.1.

HEENT examination unremarkable. Conjunctivae pink.  Sclerae anicteric.  Oral cavity no

lesions.

NECK: No JVD or lymph node enlargement.

CHEST: Clear to auscultation.

HEART:  Regular rate and rhythm.

ABDOMEN:  Soft.  Bowel sounds are positive.  No organomegaly.

EXTREMITIES:  No pedal edema.

SKIN no rashes.

NEUROLOGIC:  Alert and oriented x3.  No focal deficits.



LABS:

Done at the time of admission to the hospital: WBC 6.5, hemoglobin 13.3, platelets

normal.  Basic metabolic panel is within normal limits.  AST and ALT are 505, 1056

respectively. T-bilirubin 0.3, alkaline phosphatase is 417. Repeat labs:  AST is down

to 251, ALT is down to 984, alkaline phosphatase is 308.  Hepatitis serologies:

Hepatitis A IgM antibody is negative.  Hepatitis B and C were not done. Corona virus

PCR is negative.



IMPRESSION:

1. Acute elevation of serum transaminases with significant elevation of ALT and AST

    consistent with acute hepatocellular injury in this patient with no known history

    of chronic liver disease.  However, the patient is a very poor historian.  Baseline

    LFTs are not available at the time of this dictation.  He recently underwent cycle

    of chemotherapy for newly diagnosed CNS lymphoma with high doses of methotrexate

    and steroids.  He was also started on new medications but patient could not give me

    any details  currently, presently.  It appears most likely dealing with medication

    induced hepatitis, hepatitis C, hepatitis A IgM antibody negative which exclude

    acute hepatitis A infection.  However, other viral etiologies need to be

    considered.  His serum transaminases are slightly improved from yesterday. Covid-19

    was negative.

2. Newly diagnosed CNS lymphoma.  Oncology following the patient closely.

3. History of mild to moderate alcohol use.



RECOMMENDATIONS:

1. We will obtain hepatitis serologies for B and C as well as CMV.

2. Monitor LFTs closely.

3. Hold off on Seroquel as well as Vimpat for now as it appears these were the two

    medications that we started recently.

4. Continue on broad-spectrum antibiotics.

5. We will follow with you closely.

Thank you for this consultation.





MMODL / IJN: [id number] / Job#: [id number]

## 2020-04-13 NOTE — CT
EXAMINATION TYPE: CT brain wo con

 

DATE OF EXAM: 4/13/2020

 

COMPARISON: None

 

HISTORY: brain tumor, fever

 

CT DLP: 756 mGycm

 

Unenhanced CT of the brain was performed.  

 

There is right frontal craniotomy noted. Decreased attenuation is seen within the right frontal lobe 
compatible with vasogenic edema. No distinct mass identified however lack of contrast limits evaluati
on. No evidence for intracranial hemorrhage or extra-axial collection. No evidence for midline shift.
 No additional areas of abnormal attenuation seen at this time.

 

If symptoms persist consider MRI.  

 

IMPRESSION:

 

1. Operative changes of right frontal craniotomy with underlying vasogenic edema. No evidence for abn
ormal collection or midline shift.

## 2020-04-13 NOTE — CONS
CONSULTATION



DATE OF SERVICE:

04/12/2020



REASON FOR CONSULTATION:

Fever.



HISTORY OF PRESENT ILLNESS:

The patient is a 60-year-old  male who has been recently diagnosed with

primary CNS lymphoma diagnosed in [facility]. Patient subsequently has received

high-dose methotrexate dose a second 04/15/2020. The patient is presenting to [facility] ER early this morning for evaluation of fever and some mental status

changes. The patient denies having any headache or URI symptoms except some sore

throat.  Denies any significant chest pain, shortness of breath or cough.  No nausea,

no vomiting.  No abdominal pain or any diarrhea. On presentation to the hospital, the

patient did have a temperature of 100.2 degrees Fahrenheit.  The patient has been

saturating 97% to 98% on room air. The patient did have a normal white count and no

lymphopenia.  He did have elevated liver enzymes as well as LDH.  Lactic acid was

elevated as well.  His _____was negative.  The patient did have a influenza and

coronavirus PCR which came back negative.  The patient did have a chest x-ray which was

negative for any acute cardiopulmonary disease.  He also had a CT angiogram that was

negative for PE and did not show any covered ground-glass opacities.  Lungs were clear.

The patient did have elevated liver enzymes.  He was started on Rocephin.  Infectious

Disease was consulted for further recommendations regarding antibiotic therapy.



REVIEW OF SYSTEMS:

Positive points have been mentioned in HPI.  Rest of the systems are negative.



PAST MEDICAL HISTORY:

Recent diagnosis of CNS lymphoma, diabetes mellitus.



PAST SURGICAL HISTORY:

Brain biopsy and Mediport placement.



SOCIAL HISTORY:

Remote history of smoking.  Occasionally drinks.  No drug use.



FAMILY HISTORY:

No pertinent findings noticed.



ALLERGIES:

No known drug allergies.



MEDICATIONS:

Medications include the patient is currently on Xanax, Rocephin 2 grams daily,

dexamethasone, Dilaudid, NovoLog, Protonix, and IV fluid.



PHYSICAL EXAMINATION:

On examination, blood pressure is 113/58 with a pulse of 74, temperature 98.3. He is

98% on room air.

General description is a middle-aged male lying in bed in no distress.  No tachypnea or

accessory muscle of respiration use.

HEENT: Examination showed no pallor or scleral icterus.  Oral mucous membrane is moist.

NECK: Trachea central. No thyromegaly.

LUNGS: Unlabored breathing, clear to auscultation anteriorly.  No wheeze or crackle.

HEART: S1, S2.  Regular rate and rhythm.

ABDOMEN:  Soft, no tenderness.  No guarding or rigidity.

EXTREMITIES: No edema of feet.

SKIN EXAMINATION: No rash or mass palpable.

NEUROLOGICAL: Patient is awake, alert, oriented x2  mood and affect normal.



LABS:

Hemoglobin 13.3, white count 6.5. BUN of 13, creatinine 0.79.  Lactic acid elevated.

Liver enzymes are elevated.  Influenza, coronavirus PCR was negative.  CT angiogram

negative for any PE or pneumonia.



DIAGNOSTIC IMPRESSION AND PLAN:

Patient admitted to the hospital with low-grade fever in this patient who did have some

mental status changes with recent diagnosis of CNS lymphoma and has received high-dose

methotrexate with _____dose 04/15/2020 with elevated liver with question of possible

intraabdominal source for his fever or related to underlying CNS lymphoma. The patient

currently does not look toxic and no other obvious clinical focus of infection.



PLAN:

1. We will check a UA and culture to complete the workup.

2. Check an ultrasound of the abdomen.

3. Continue Rocephin 2 grams daily.

4. We will follow on his clinical condition and culture to further adjust medication

    if needed.

Thank you for this consultation. Will follow this patient along with you.





MMODL / IJN: [id number] / Job#: [id number]

## 2020-04-13 NOTE — P.PN
Subjective


Progress Note Date: 04/13/20


Principal diagnosis: 





Primary CNS lymphoma, admitted with fever of unknown origin





In follow-up today patient has no specific complaints, he is certainly confused 

to recent events, he believes that he received a treatment for lymphoma about a 

month ago.  He becomes rather verbally aggressive during our discussion.  He is 

denying any acute physical complaints





Objective





- Vital Signs


Vital signs: 


                                   Vital Signs











Temp  98.1 F   04/13/20 05:39


 


Pulse  69   04/13/20 05:39


 


Resp  16   04/13/20 05:39


 


BP  119/65   04/13/20 05:39


 


Pulse Ox  94 L  04/13/20 05:39








                                 Intake & Output











 04/12/20 04/13/20 04/13/20





 18:59 06:59 18:59


 


Weight 99.79 kg  90.718 kg


 


Other:   


 


  Voiding Method  Toilet 


 


  # Voids 2 2 














- Constitutional


General appearance: Present: average body habitus, no acute distress





- EENT


Eyes: Present: anicteric sclerae, EOMI


ENT: Present: hearing grossly normal





- Respiratory


Details: 





Respirations even and unlabored





- Peripheral edema


  ** leg


Peripheral Edema: bilateral: None





- Integumentary


Integumentary: Present: normal





- Neurologic


Neurologic Comment(s): 





Patient visualized moving all 4 extremities, he can adjust his own body weight, 

no gross focal or motor or neuro deficits noted


Neurologic: Present: CNII-XII intact





- Musculoskeletal


Musculoskeletal: Present: strength equal bilaterally





- Psychiatric


Psychiatric: Present: A&O x's 3





- Labs


CBC & Chem 7: 


                                 04/13/20 06:04





                                 04/13/20 06:04


Labs: 


                  Abnormal Lab Results - Last 24 Hours (Table)











  04/12/20 04/12/20 04/12/20 Range/Units





  08:25 13:07 16:42 


 


Sodium     (137-145)  mmol/L


 


Potassium     (3.5-5.1)  mmol/L


 


Chloride     ()  mmol/L


 


Glucose     (74-99)  mg/dL


 


POC Glucose (mg/dL)     (75-99)  mg/dL


 


Plasma Lactic Acid Chauncey   2.4 H*   (0.7-2.0)  mmol/L


 


Uric Acid    2.3 L  (3.5-8.5)  mg/dL


 


Ferritin  804.1 H    (22.0-322.0)  ng/mL


 


AST     (17-59)  U/L


 


ALT     (4-49)  U/L


 


Alkaline Phosphatase     ()  U/L


 


Urine Glucose (UA)     (Negative)  


 


Urine Blood     (Negative)  


 


Ur Leukocyte Esterase     (Negative)  


 


Urine WBC     (0-5)  /hpf


 


Urine WBC Clumps     (None)  /hpf














  04/12/20 04/13/20 04/13/20 Range/Units





  20:39 02:00 06:04 


 


Sodium    131 L  (137-145)  mmol/L


 


Potassium    5.2 H  (3.5-5.1)  mmol/L


 


Chloride    94 L  ()  mmol/L


 


Glucose    296 H  (74-99)  mg/dL


 


POC Glucose (mg/dL)  301 H    (75-99)  mg/dL


 


Plasma Lactic Acid Chauncey     (0.7-2.0)  mmol/L


 


Uric Acid     (3.5-8.5)  mg/dL


 


Ferritin     (22.0-322.0)  ng/mL


 


AST    251 H  (17-59)  U/L


 


ALT    984 H  (4-49)  U/L


 


Alkaline Phosphatase    308 H  ()  U/L


 


Urine Glucose (UA)   4+ H   (Negative)  


 


Urine Blood   Trace H   (Negative)  


 


Ur Leukocyte Esterase   Moderate H   (Negative)  


 


Urine WBC   26 H   (0-5)  /hpf


 


Urine WBC Clumps   Rare H   (None)  /hpf














  04/13/20 Range/Units





  07:24 


 


Sodium   (137-145)  mmol/L


 


Potassium   (3.5-5.1)  mmol/L


 


Chloride   ()  mmol/L


 


Glucose   (74-99)  mg/dL


 


POC Glucose (mg/dL)  267 H  (75-99)  mg/dL


 


Plasma Lactic Acid Chauncey   (0.7-2.0)  mmol/L


 


Uric Acid   (3.5-8.5)  mg/dL


 


Ferritin   (22.0-322.0)  ng/mL


 


AST   (17-59)  U/L


 


ALT   (4-49)  U/L


 


Alkaline Phosphatase   ()  U/L


 


Urine Glucose (UA)   (Negative)  


 


Urine Blood   (Negative)  


 


Ur Leukocyte Esterase   (Negative)  


 


Urine WBC   (0-5)  /hpf


 


Urine WBC Clumps   (None)  /hpf








                      Microbiology - Last 24 Hours (Table)











 04/12/20 08:25 Blood Culture - Preliminary





 Blood    No Growth after 24 hours


 


 04/13/20 02:00 Urine Culture - Preliminary





 Urine,Voided 














- Imaging and Cardiology


CT Scan - head: report reviewed (Right frontal craniotomy noted)





Assessment and Plan


(1) Fever of unknown origin


Narrative/Plan: 


Coronavirus negative.  Influenza negative.  Suspicious looking urine, pending 

culture.  Blood cultures negative at 24 hours.  Empiric antibiotics


Current Visit: Yes   Status: Acute   Priority: High   Code(s): R50.9 - FEVER, 

UNSPECIFIED   SNOMED Code(s): 1029343


   





(2) Elevated liver enzymes


Narrative/Plan: 


Slightly better today.  Pending Gastroenterology consult.  Will see if I can get

any lab results from [place] to see what his baseline is.


Current Visit: Yes   Status: Acute   Priority: High   Code(s): R74.8 - ABNORMAL 

LEVELS OF OTHER SERUM ENZYMES   SNOMED Code(s): 379692065


   





(3) Primary CNS lymphoma


Narrative/Plan: 


Pending information from [place] regarding patient's CNS lymphoma treatment.  

CT of the head is showing craniotomy.  Patient recalls getting a treatment last 

month.  Will update the chart with information as I receive it.  I would believe

patient would be due for a cycle very soon, if not late for cycle.  This will be

followed up on.


Current Visit: Yes   Status: Acute   Priority: High   Code(s): C85.89 - OTH 

TYPES OF NON-HODG LYMPH, EXTRNOD AND SOLID ORGAN SITES   SNOMED Code(s): 

866662832


   





(4) Hyponatremia


Narrative/Plan: 


Multifactorial, suspect possibly SIADH post frontal crainotomy


Current Visit: Yes   Status: Acute   Priority: High   Code(s): E87.1 - HYPO-

OSMOLALITY AND HYPONATREMIA   SNOMED Code(s): 63084259

## 2020-04-13 NOTE — PN
PROGRESS NOTE



DATE OF SERVICE:

04/13/2020



This 60-year-old gentleman who was admitted with a fever was suspected to have sepsis.

The patient also had elevated AST, ALT.  The COVID is negative.  Patient is being

closely monitored at this time.  A CT scan of the brain was also done which showed

operative changes of right frontal craniotomy and some vasogenic edema.  No evidence of

abnormal collection was noted. Past medical history reviewed.



REVIEW OF SYSTEMS:

CARDIOVASCULAR SYSTEM: No angina, palpitations.

RESPIRATORY SYSTEM: As mentioned earlier.

GI: As mentioned earlier.

: No dysuria or retention.

NERVOUS SYSTEM: No numbness, weakness.



CURRENT MEDICATIONS:

Reviewed. They include:

1. Xanax 0.25 t.i.d.

2. Rocephin 1 gram daily.

3. Hexadrol.

4. Flonase.

5. Dilaudid.

6. Protonix.



PHYSICAL EXAMINATION:

Patient is alert and oriented x2. Pulse 66, blood pressure 119/60, respirations 16,

temperature 97.8, pulse ox 94% on room air.

HEENT: Conjunctivae normal.

NECK: No jugular venous distention.

CARDIOVASCULAR SYSTEM:  S1, S2 muffled.

RESPIRATORY SYSTEM: Breath sounds diminished at the bases.  A few scattered rhonchi.

ABDOMEN: Soft, non-tender.

LEGS: No edema. No swelling.

NERVOUS SYSTEM: No focal deficit.



LABS:

Sodium 139, potassium 5.2. AST is 251, ALT is 984.  Alkaline phosphatase is 308.



ASSESSMENT:

1. Fever for evaluation; rule out sepsis; rule out cholangitis.

2. Increased AST, ALT and alkaline phosphatase; possibly hepatitis; rule out

    extrahepatic biliary obstruction.

3. Elevated alkaline phosphatase, LDH.

4. Elevated plasma lactic acid.

5. Hypomagnesemia.

6. Diabetes mellitus, type 2, uncontrolled, with possible new onset secondary to

    steroids.

7. Hyponatremia.

8. Elevated D-dimer without any evidence of pulmonary embolism.

9. History of recently diagnosed diagnosis CNS lymphoma.

10.Gastroesophageal reflux disease.

11.Hyperlipidemia.

12.History of nicotine dependence.

13.COVID negative.



RECOMMENDATIONS AND DISCUSSION:

I recommend to continue current medications, continue with the monitoring, symptomatic

treatment. Continue with the broad-spectrum IV antibiotics.  I would also recommend an

ultrasound of the abdomen looking for any evidence of cholelithiasis.  Prognosis

guarded because of multiple complex medical issues. Further recommendations to follow.



I would also recommend a CT scan of the abdomen as well to complete the workup.

Prognosis guarded.  Further recommendations to follow. Follow closely with multiple

consultants.





GEMMA / LUCY: [id number] / Job#: [id number]

## 2020-04-13 NOTE — CT
EXAMINATION TYPE: CT abdomen pelvis wo con

 

DATE OF EXAM: 4/13/2020

 

COMPARISON: None

 

HISTORY: Elevated LFTs.

 

CT DLP: 696.1 mGycm

Automated exposure control for dose reduction was used.

 

TECHNIQUE:  Helical acquisition of images was performed from the lung bases through the pelvis.

 

FINDINGS: 

 

LUNG BASES: Subsegmental changes at the lung bases most typical atelectasis.

 

LIVER/GB: Liver measures 22 cm and is enlarged. No focal mass.

 

PANCREAS: No significant abnormality is seen.

 

SPLEEN: No significant abnormality is seen.

 

ADRENALS: No significant abnormality is seen.

 

KIDNEYS: Punctate hyperdensity within the right kidney axial image 32 may represent a tiny nonobstruc
ting renal stone. Lobulation the renal cortex bilaterally.

 

ADENOPATHY:  None visualized.

 

OSSEOUS STRUCTURES:  Hypertrophic and degenerative change of the spine.

 

BOWEL:  No significant abnormality is seen.

 

OTHER: Aorta of normal caliber with atherosclerotic changes.

 

IMPRESSION:

1. Hepatomegaly.

2. Suspected 1 mm nonobstructing renal stone with changes of chronic cortical renal lobulation.

## 2020-04-14 LAB
ALBUMIN SERPL-MCNC: 3.5 G/DL (ref 3.5–5)
ALP SERPL-CCNC: 254 U/L (ref 38–126)
ALT SERPL-CCNC: 683 U/L (ref 4–49)
ANION GAP SERPL CALC-SCNC: 7 MMOL/L
AST SERPL-CCNC: 90 U/L (ref 17–59)
BASOPHILS # BLD AUTO: 0 K/UL (ref 0–0.2)
BASOPHILS NFR BLD AUTO: 1 %
BUN SERPL-SCNC: 18 MG/DL (ref 9–20)
CALCIUM SPEC-MCNC: 9.2 MG/DL (ref 8.4–10.2)
CHLORIDE SERPL-SCNC: 96 MMOL/L (ref 98–107)
CO2 SERPL-SCNC: 31 MMOL/L (ref 22–30)
EOSINOPHIL # BLD AUTO: 0 K/UL (ref 0–0.7)
EOSINOPHIL NFR BLD AUTO: 0 %
ERYTHROCYTE [DISTWIDTH] IN BLOOD BY AUTOMATED COUNT: 4.61 M/UL (ref 4.3–5.9)
ERYTHROCYTE [DISTWIDTH] IN BLOOD: 13.5 % (ref 11.5–15.5)
GLUCOSE BLD-MCNC: 213 MG/DL (ref 75–99)
GLUCOSE BLD-MCNC: 293 MG/DL (ref 75–99)
GLUCOSE BLD-MCNC: 343 MG/DL (ref 75–99)
GLUCOSE BLD-MCNC: 350 MG/DL (ref 75–99)
GLUCOSE SERPL-MCNC: 200 MG/DL (ref 74–99)
HCT VFR BLD AUTO: 44.1 % (ref 39–53)
HGB BLD-MCNC: 14.3 GM/DL (ref 13–17.5)
LYMPHOCYTES # SPEC AUTO: 1.6 K/UL (ref 1–4.8)
LYMPHOCYTES NFR SPEC AUTO: 21 %
MAGNESIUM SPEC-SCNC: 2 MG/DL (ref 1.6–2.3)
MCH RBC QN AUTO: 30.9 PG (ref 25–35)
MCHC RBC AUTO-ENTMCNC: 32.3 G/DL (ref 31–37)
MCV RBC AUTO: 95.7 FL (ref 80–100)
MONOCYTES # BLD AUTO: 0.4 K/UL (ref 0–1)
MONOCYTES NFR BLD AUTO: 5 %
NEUTROPHILS # BLD AUTO: 5.5 K/UL (ref 1.3–7.7)
NEUTROPHILS NFR BLD AUTO: 71 %
PLATELET # BLD AUTO: 378 K/UL (ref 150–450)
POTASSIUM SERPL-SCNC: 4.9 MMOL/L (ref 3.5–5.1)
PROT SERPL-MCNC: 6.5 G/DL (ref 6.3–8.2)
SODIUM SERPL-SCNC: 134 MMOL/L (ref 137–145)
WBC # BLD AUTO: 7.8 K/UL (ref 3.8–10.6)

## 2020-04-14 RX ADMIN — INSULIN ASPART SCH UNIT: 100 INJECTION, SOLUTION INTRAVENOUS; SUBCUTANEOUS at 18:53

## 2020-04-14 RX ADMIN — INSULIN ASPART SCH UNIT: 100 INJECTION, SOLUTION INTRAVENOUS; SUBCUTANEOUS at 08:57

## 2020-04-14 RX ADMIN — DEXAMETHASONE SCH MG: 4 TABLET ORAL at 08:57

## 2020-04-14 RX ADMIN — INSULIN ASPART SCH UNIT: 100 INJECTION, SOLUTION INTRAVENOUS; SUBCUTANEOUS at 21:12

## 2020-04-14 RX ADMIN — PANTOPRAZOLE SODIUM SCH MG: 40 INJECTION, POWDER, FOR SOLUTION INTRAVENOUS at 08:57

## 2020-04-14 RX ADMIN — INSULIN ASPART SCH UNIT: 100 INJECTION, SOLUTION INTRAVENOUS; SUBCUTANEOUS at 12:51

## 2020-04-14 NOTE — PN
PROGRESS NOTE



DATE OF SERVICE:

04/14/2020



REASON FOR FOLLOWUP:

Fever, possible UTI.



INTERVAL HISTORY:

The patient is currently afebrile, has been breathing comfortably. Patient denies

having any chest pain.  No shortness of breath or cough.  No nausea, vomiting,

abdominal pain, no diarrhea.  Patient is anxious to go home.



PHYSICAL EXAMINATION:

Blood pressure 122/71 with a pulse of 75, temperature 98.2, he is 96% on room air.  The

patient is a middle-aged male, lying in bed in no distress.

RESPIRATORY SYSTEM: Unlabored breathing, clear to auscultation anteriorly.

HEART:  S1, S2.  Regular rate and rhythm.

ABDOMEN:  Soft, no tenderness.



LABS:

Hemoglobin 14.8, white count 7.2, BUN of 18, creatinine 0.83.  Urine showing

presumptive Staph aureus.



DIAGNOSTIC IMPRESSION AND PLAN:

Patient admitted to the hospital with fever and mental status changes, possible UTI,

urine showing Staph aureus, possible MSSA.  The patient is clinically responding to the

Rocephin.  Will wait for the culture to finalize to determine discharge antibiotics.

Continue supportive care.





MMODL / IJN: [id number] / Job#: [id number]

## 2020-04-14 NOTE — P.PN
Subjective


Progress Note Date: 04/14/20


Principal diagnosis: 





Primary CNS lymphoma, admitted with fever of unknown origin





In follow-up today patient has no specific complaints, he is certainly confused 

to recent events, he believes that he received a treatment for lymphoma about a 

month ago.  No physical complaints or pain 








Summary of hospitalization from [facility].  Patient presented to [facility] 

emergency department, altered mental status.  CT of the head without contrast 

showed extensive vasogenic edema involving the right frontal.  Density 5.5 x 4.4

x 3.2 cm and the largest AP answers and craniocaudal dimensions.  Mass effect 

and effacement of the right lateral ventricular system.  MRI with gadolinium 

confirmed a large mass lesion.  CT of the chest abdomen and pelvis showed a 

hypodense left renal lesion that was indeterminate, bladder thickening felt to 

be related to incomplete distention, no evidence of enlarged lymph nodes or 

mass.  He went to surgery with Dr. Woodall 3/14/20, biopsy of intracranial mass,

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  CD45 positive, 10% 

population of monoclonal B cells with lambda light chain restriction, weakly 

positive for BCL2 (subsequently addendum states negative), CD30 interpreted as 

negative, and MYC rearrangement negative, positive for BCL 6 rearrangement.  

3/18/20 patient had bone marrow biopsy and aspirate performed, no evidence of 

lymphoma was identified, decreased iron stores, peripheral blood showed 

neutrophilic leukocytosis, no circulating blasts.  Negative for MYC, BCL 6 

rearrangements.  Patient was started on high-dose methotrexate and Rituxan 

3/25/20, next cycle documented as being due 4/15/20.  Patient had elevated tr

ansaminases documented (no numbers), felt to be related to methotrexate.  His 

statin was held.  He was supposed to continue allopurinol 300 mg daily for 4 

weeks.  His Decadron dose was reduced to 4 mg daily.





Objective





- Vital Signs


Vital signs: 


                                   Vital Signs











Temp  98.2 F   04/14/20 08:19


 


Pulse  77   04/14/20 08:19


 


Resp  18   04/14/20 08:19


 


BP  122/71   04/14/20 08:19


 


Pulse Ox  96   04/14/20 08:19








                                 Intake & Output











 04/13/20 04/14/20 04/14/20





 18:59 06:59 18:59


 


Intake Total 650 500 


 


Balance 650 500 


 


Weight 90.718 kg  


 


Intake:   


 


    


 


    Sodium Chloride 0.9% 1, 600  





    000 ml @ 75 mls/hr IV .   





    [id number] ONE Rx#:[id number]   


 


    cefTRIAXone 2 gm In 50  





    Sodium Chloride 0.9% 50   





    ml @ 100 mls/hr IVPB Q24H   





    [facility] Rx#:[id number]   


 


  Oral  500 


 


Other:   


 


  Voiding Method  Toilet 


 


  # Voids  2 














- Constitutional


General appearance: Present: average body habitus, cooperative, no acute 

distress





- EENT


Eyes: Present: anicteric sclerae, EOMI


ENT: Present: hearing grossly normal





- Respiratory


Respiratory: bilateral: CTA





- Cardiovascular


Rhythm: regular


Heart sounds: normal: S1, S2


Abnormal Heart Sounds: Absent: systolic murmur, diastolic murmur, rub, S3 

Gallop, S4 Gallop, click, other





- Peripheral edema


  ** leg


Peripheral Edema: bilateral: None





- Gastrointestinal


General gastrointestinal: Present: normal bowel sounds, soft.  Absent: absent 

bowel sounds, decreased bowel sounds, distended, hepatomegaly, hyperactive bowel

sounds, organomegaly, rigid, scaphoid, splenomegaly, tenderness, umbilical 

hernia, ventral hernia





- Integumentary


Integumentary: Present: normal, normal turgor





- Neurologic


Neurologic: Present: CNII-XII intact





- Musculoskeletal


Musculoskeletal: Present: strength equal bilaterally





- Psychiatric


Psychiatric: Present: A&O x's 3





- Labs


CBC & Chem 7: 


                                 04/14/20 07:04





                                 04/14/20 07:04


Labs: 


                  Abnormal Lab Results - Last 24 Hours (Table)











  04/12/20 04/13/20 04/13/20 Range/Units





  08:25 16:48 20:21 


 


Sodium     (137-145)  mmol/L


 


Chloride     ()  mmol/L


 


Carbon Dioxide     (22-30)  mmol/L


 


Glucose     (74-99)  mg/dL


 


POC Glucose (mg/dL)   298 H  217 H  (75-99)  mg/dL


 


AST     (17-59)  U/L


 


ALT     (4-49)  U/L


 


Alkaline Phosphatase     ()  U/L


 


Procalcitonin  0.26 H    (0.02-0.09)  ng/mL














  04/14/20 04/14/20 04/14/20 Range/Units





  07:04 07:24 12:15 


 


Sodium  134 L    (137-145)  mmol/L


 


Chloride  96 L    ()  mmol/L


 


Carbon Dioxide  31 H    (22-30)  mmol/L


 


Glucose  200 H    (74-99)  mg/dL


 


POC Glucose (mg/dL)   213 H  293 H  (75-99)  mg/dL


 


AST  90 H    (17-59)  U/L


 


ALT  683 H    (4-49)  U/L


 


Alkaline Phosphatase  254 H    ()  U/L


 


Procalcitonin     (0.02-0.09)  ng/mL








                      Microbiology - Last 24 Hours (Table)











 04/12/20 08:25 Blood Culture - Preliminary





 Blood    No Growth after 48 hours


 


 04/13/20 02:00 Urine Culture - Preliminary





 Urine,Voided    Presumptive Staph aureus














- Imaging and Cardiology


US - abdomen: report reviewed (hepatomegaly, Hx hepatitis C)





Assessment and Plan


(1) Fever of unknown origin


Current Visit: Yes   Status: Resolved   Priority: High   Code(s): R50.9 - FEVER,

UNSPECIFIED   SNOMED Code(s): 3645640


   





(2) Elevated liver enzymes


Narrative/Plan: 


Reviewed Gastroenterology consult.  No actual numbers from [facility] but, Physic

kevyn notes report elevated LFTs


Current Visit: Yes   Status: Acute   Priority: High   Code(s): R74.8 - ABNORMAL 

LEVELS OF OTHER SERUM ENZYMES   SNOMED Code(s): 086050759


   





(3) Primary CNS lymphoma


Narrative/Plan: 


Status post first cycle Rituxan, high-dose methotrexate on 3/25/20.


Next cycle due 4/15/20.


Current Visit: Yes   Status: Acute   Priority: High   Code(s): C85.89 - OTH 

TYPES OF NON-HODG LYMPH, EXTRNOD AND SOLID ORGAN SITES   SNOMED Code(s): 

334337748


   





(4) Hyponatremia


Narrative/Plan: 


Multifactorial, suspect possibly SIADH post frontal crainotomy











Current Visit: Yes   Status: Acute   Priority: High   Code(s): E87.1 - HYPO-

OSMOLALITY AND HYPONATREMIA   SNOMED Code(s): 48111483


   


Plan: 





Doctor attests:


I performed a history and physical examination of this patient, developed 

impression and plan of care, discussed with dictator.  I agree with dictators 

note, documented as a scribe

## 2020-04-14 NOTE — PN
PROGRESS NOTE



DATE OF SERVICE:

04/14/2020



The patient is a 60-year-old pleasant white male, recently diagnosed with CNS non-

Hodgkin's lymphoma who is undergoing radiation therapy as well as chemotherapy,

admitted to the hospital 2 days ago.  We have been seeing him for evaluation of

elevated serum transaminases during this hospitalization. He was started on broad-

spectrum antibiotics because of fever or unknown origin. He is doing much better.  He

denies any symptoms today.  No abdominal pain.  No nausea, vomiting.



PHYSICAL EXAMINATION:

He appears comfortable, in no apparent distress. Vital signs are stable. Blood pressure

132/71, pulse is 77, temperature 98.2.

HEENT:  Examination unremarkable. Conjunctivae are pink, sclerae nonicteric.  Oral

cavity no lesion.

NECK:  No JVD or lymph node enlargement.

CHEST: Clear to auscultation.

HEART:  Regular rate and rhythm.

ABDOMEN:  Soft.  Bowel sounds are positive.  No organomegaly.

EXTREMITIES:  No pedal edema.

SKIN:  No rashes.

NEUROLOGIC:  Alert and oriented x3, no focal deficits.



LABS:

From today AST is down to 90, ALT is down to 693, alkaline phosphatase 254, T bilirubin

is normal.  Hepatitis serologies for A, B, and C are negative. COVID-19 is negative.

CMV and EBV serology yesterday pending.



IMPRESSION:

1. Elevated LFTs, possibly acute hepatitis most likely medication-related, positive

    from recent chemotherapy.  So far, hepatitis serologies for A, B and C were

    negative.  Serum transaminases are gradually improving and patient remains stable

    and asymptomatic.

2. Primary CNS lymphoma diagnosed March of 2020, status post 4 cycles of chemotherapy

    and next cycle is due tomorrow.

3. Fever or unknown origin, on antibiotics.  Oncology following the patient closely.



RECOMMENDATIONS:

In regards to elevated LFTs which are gradually improving, will continue to monitor

them closely. Await the rest of the labs as requested.  Repeat labs in the morning and

will follow with you closely.  Thank you for this consultation.





MMODL / IJN: [id number] / Job#: [id number]

## 2020-04-14 NOTE — US
EXAMINATION TYPE: US liver

 

DATE OF EXAM: 4/14/2020

 

COMPARISON: NONE

 

CLINICAL HISTORY: elevated LFT. Elevated liver enzymes 

 

EXAM MEASUREMENTS:

 

Liver Length:  19.3 cm   

Gallbladder Wall:  0.2 cm   

CBD:  0.5 cm

Right Kidney:  11.3 x 4.1 x 4.9 cm

 

 

 

Pancreas:  Obscured by bowel gas

Liver:  enlarged   

Gallbladder:  no evidence of stones

**Evidence for sonographic Hung's sign:  no

CBD:  appears wnl 

Right Kidney:  no evidence of hydronephrosis  

 

 

 

IMPRESSION: Hepatomegaly with coarsened echotexture can be associated with hepatic steatosis or hepat
itis correlate clinically.

## 2020-04-15 VITALS
HEART RATE: 60 BPM | DIASTOLIC BLOOD PRESSURE: 56 MMHG | SYSTOLIC BLOOD PRESSURE: 136 MMHG | TEMPERATURE: 97.5 F | RESPIRATION RATE: 15 BRPM

## 2020-04-15 LAB
ALBUMIN SERPL-MCNC: 3.2 G/DL (ref 3.5–5)
ALP SERPL-CCNC: 202 U/L (ref 38–126)
ALT SERPL-CCNC: 462 U/L (ref 4–49)
ANION GAP SERPL CALC-SCNC: 6 MMOL/L
AST SERPL-CCNC: 53 U/L (ref 17–59)
BASOPHILS # BLD AUTO: 0 K/UL (ref 0–0.2)
BASOPHILS NFR BLD AUTO: 0 %
BUN SERPL-SCNC: 20 MG/DL (ref 9–20)
CALCIUM SPEC-MCNC: 9.1 MG/DL (ref 8.4–10.2)
CHLORIDE SERPL-SCNC: 99 MMOL/L (ref 98–107)
CO2 SERPL-SCNC: 28 MMOL/L (ref 22–30)
EOSINOPHIL # BLD AUTO: 0.1 K/UL (ref 0–0.7)
EOSINOPHIL NFR BLD AUTO: 1 %
ERYTHROCYTE [DISTWIDTH] IN BLOOD BY AUTOMATED COUNT: 4.3 M/UL (ref 4.3–5.9)
ERYTHROCYTE [DISTWIDTH] IN BLOOD: 13.6 % (ref 11.5–15.5)
GLUCOSE BLD-MCNC: 208 MG/DL (ref 75–99)
GLUCOSE BLD-MCNC: 243 MG/DL (ref 75–99)
GLUCOSE SERPL-MCNC: 206 MG/DL (ref 74–99)
GLUCOSE UR QL: (no result)
HCT VFR BLD AUTO: 40.8 % (ref 39–53)
HGB BLD-MCNC: 13.6 GM/DL (ref 13–17.5)
LYMPHOCYTES # SPEC AUTO: 2 K/UL (ref 1–4.8)
LYMPHOCYTES NFR SPEC AUTO: 26 %
MCH RBC QN AUTO: 31.7 PG (ref 25–35)
MCHC RBC AUTO-ENTMCNC: 33.4 G/DL (ref 31–37)
MCV RBC AUTO: 94.9 FL (ref 80–100)
MONOCYTES # BLD AUTO: 0.4 K/UL (ref 0–1)
MONOCYTES NFR BLD AUTO: 6 %
NEUTROPHILS # BLD AUTO: 4.9 K/UL (ref 1.3–7.7)
NEUTROPHILS NFR BLD AUTO: 65 %
PH UR: 5.5 [PH] (ref 5–8)
PLATELET # BLD AUTO: 390 K/UL (ref 150–450)
POTASSIUM SERPL-SCNC: 4.4 MMOL/L (ref 3.5–5.1)
PROT SERPL-MCNC: 6 G/DL (ref 6.3–8.2)
RBC UR QL: 30 /HPF (ref 0–5)
SODIUM SERPL-SCNC: 133 MMOL/L (ref 137–145)
SP GR UR: 1.03 (ref 1–1.03)
SQUAMOUS UR QL AUTO: <1 /HPF (ref 0–4)
UROBILINOGEN UR QL STRIP: <2 MG/DL (ref ?–2)
WBC # BLD AUTO: 7.6 K/UL (ref 3.8–10.6)
WBC # UR AUTO: 46 /HPF (ref 0–5)

## 2020-04-15 RX ADMIN — INSULIN ASPART SCH UNIT: 100 INJECTION, SOLUTION INTRAVENOUS; SUBCUTANEOUS at 07:16

## 2020-04-15 RX ADMIN — INSULIN ASPART SCH UNIT: 100 INJECTION, SOLUTION INTRAVENOUS; SUBCUTANEOUS at 11:45

## 2020-04-15 RX ADMIN — DEXAMETHASONE SCH MG: 4 TABLET ORAL at 08:23

## 2020-04-15 NOTE — PN
PROGRESS NOTE



DATE OF SERVICE:

04/15/2020



REASON FOR FOLLOWUP:

MRSA urinary tract infection.



INTERVAL HISTORY:

The patient is currently afebrile, has been breathing comfortably. The patient denies

having any chest pain or shortness of breath or cough.  No nausea or vomiting.  No

abdominal pain.  No diarrhea.  The patient is insisting on going home.



PHYSICAL EXAMINATION:

Blood pressure 136/56, pulse of 60, temperature 97.5. He is 93% on room air.

General description is a middle-aged male up in the room in no distress.

RESPIRATORY SYSTEM: Unlabored breathing. Clear to auscultation anteriorly.

HEART: S1, S2.  Regular rate and rhythm.

ABDOMEN: Soft. No tenderness.



LABS:

Hemoglobin is 13.6, white count 7.6, creatinine 0.76.  Urine shows moderate leukocyte

esterase and 46 WBCs. Previous urine culture with MRSA resistant to Bactrim.



DIAGNOSTIC IMPRESSION AND PLAN:

Patient with a fever, possible urinary tract infection.  Urine has been finalized with

MRSA that is resistant to Bactrim.  Patient has been given a dose of vancomycin. As the

patient is insistent on going home, only oral option is the Macrobid, with a risk of

failure.  This has been explained to the patient and he is advised if any recurrence of

symptoms to let us know right away.





MMODL / IJN: [id number] / Job#: [id number]

## 2020-04-15 NOTE — PN
PROGRESS NOTE



DATE OF SERVICE:

04/15/2020



The patient is a 60-year-old pleasant white male, who was admitted to the hospital

three days ago with possible urinary tract infection, on broad-spectrum antibiotics.

He was recently diagnosed with CNS lymphoma in March of 2020 at [facility] and underwent brain biopsy.  He received high-dosed chemotherapy three weeks

ago.  He was admitted to the hospital with urinary tract infection and was noted to

have acute elevation of serum transaminases which are gradually improving.  Hepatitis

serologies for A, B, and C were negative.  Serologies for EBV IgM was negative and CMV

IgM antibody was also negative.  The patient in the meantime is doing well.  He denies

any complaints.



PHYSICAL EXAMINATION:

On physical examination, vital signs are stable.  Blood pressure is 136/56, pulse is

60, temperature is 97.5.

HEENT:  Examination is unremarkable.  Conjunctivae are pink.  Sclerae nonicteric.  Oral

cavity, no lesion.

NECK:  No jugular venous distention or lymph node enlargement.

CHEST:  Clear to auscultation.

HEART:  Regular rate and rhythm.

ABDOMEN:  Abdomen is soft.  Bowel sounds are positive.  No organomegaly.

EXTREMITIES:  No pedal edema.

SKIN:  No rashes.

NEUROLOGIC:  Alert and oriented x3, no focal deficits.



LABS:

Labs from today, CBC is within normal limits.  AST is down to 53, ALT is down to 462,

alkaline phosphatase is 202, and T-bilirubin is normal.



IMPRESSION:

1. Acute elevation of serum transaminases, which were in the range of 1000 at the time

    of admission to the hospital, most likely related to medication-induced hepatitis

    from recent chemotherapy that was given for CNS lymphoma.  Hepatitis serologies

    were negative.  CMV IgM and EBV IgM antibody were negative.  COVID-19 negative.

    Serum transaminases are significantly improved with AST almost normal and ALT still

    elevated at 462.

2. Recent diagnosis of CNS lymphoma, status post chemotherapy, one cycle.  He is going

    to be followed by Dr. Bryant.

3. Urinary tract infection, on antibiotics, improving.



RECOMMENDATIONS:

1. Monitor LFTs on a close basis.

2. Continue with current antibiotics.

3. Further management as per Oncology.



Thank you for this consultation.





MMODL / IJN: [id number] / Job#: [id number]

## 2020-04-15 NOTE — P.PN
Subjective


Progress Note Date: 04/15/20


Principal diagnosis: 





Primary CNS lymphoma, admitted with fever of unknown origin





In follow-up today patient denies nausea, difficulty in breathing, cough, 

appetite is good, denies acute changes in bowel or bladder habits.

















Objective





- Vital Signs


Vital signs: 


                                   Vital Signs











Temp  97.5 F L  04/15/20 07:00


 


Pulse  60   04/15/20 07:00


 


Resp  15   04/15/20 08:00


 


BP  136/56   04/15/20 07:00


 


Pulse Ox  96   04/15/20 07:00








                                 Intake & Output











 04/14/20 04/15/20 04/15/20





 18:59 06:59 18:59


 


Intake Total  960 480


 


Balance  960 480


 


Intake:   


 


  Oral  960 480


 


Other:   


 


  Voiding Method Toilet  


 


  # Voids 2  2














- Labs


CBC & Chem 7: 


                                 04/15/20 07:21





                                 04/15/20 07:21


Labs: 


                  Abnormal Lab Results - Last 24 Hours (Table)











  04/14/20 04/14/20 04/15/20 Range/Units





  17:03 20:58 06:47 


 


Sodium     (137-145)  mmol/L


 


Glucose     (74-99)  mg/dL


 


POC Glucose (mg/dL)  343 H  350 H  208 H  (75-99)  mg/dL


 


ALT     (4-49)  U/L


 


Alkaline Phosphatase     ()  U/L


 


Total Protein     (6.3-8.2)  g/dL


 


Albumin     (3.5-5.0)  g/dL


 


Urine Protein     (Negative)  


 


Urine Glucose (UA)     (Negative)  


 


Urine Blood     (Negative)  


 


Ur Leukocyte Esterase     (Negative)  


 


Urine RBC     (0-5)  /hpf


 


Urine WBC     (0-5)  /hpf


 


Urine Mucus     (None)  /hpf














  04/15/20 04/15/20 04/15/20 Range/Units





  07:21 11:35 11:45 


 


Sodium  133 L    (137-145)  mmol/L


 


Glucose  206 H    (74-99)  mg/dL


 


POC Glucose (mg/dL)   243 H   (75-99)  mg/dL


 


ALT  462 H    (4-49)  U/L


 


Alkaline Phosphatase  202 H    ()  U/L


 


Total Protein  6.0 L    (6.3-8.2)  g/dL


 


Albumin  3.2 L    (3.5-5.0)  g/dL


 


Urine Protein    Trace H  (Negative)  


 


Urine Glucose (UA)    3+ H  (Negative)  


 


Urine Blood    Small H  (Negative)  


 


Ur Leukocyte Esterase    Moderate H  (Negative)  


 


Urine RBC    30 H  (0-5)  /hpf


 


Urine WBC    46 H  (0-5)  /hpf


 


Urine Mucus    Few H  (None)  /hpf








                      Microbiology - Last 24 Hours (Table)











 04/12/20 08:25 Blood Culture - Preliminary





 Blood    No Growth after 72 hours


 


 04/13/20 02:00 Urine Culture - Final





 Urine,Voided    Methicillin resist S. aureus














Assessment and Plan


(1) Fever of unknown origin


Narrative/Plan: 


Patient is positive for MRSA UTI.   Spoke with patient's wife on the phone.  

Prior to admit, patient was walking around the house, confused and it was 

dribbling urine. 





Status: Resolved   Priority: High   Code(s): R50.9 - FEVER, UNSPECIFIED   SNOMED

Code(s): 5660986


   





(2) Elevated liver enzymes


Narrative/Plan: 


Reviewed Gastroenterology consult.  No actual numbers from [facility] but, 

Physician notes report elevated LFTs.  Trending down very nicely at this time.  


Status: Acute   Priority: High   Code(s): R74.8 - ABNORMAL LEVELS OF OTHER SERUM

ENZYMES   SNOMED Code(s): 552088108


   





(3) Primary CNS lymphoma


Narrative/Plan: 


Status post first cycle Rituxan, high-dose methotrexate on 3/25/20.


Next cycle due 4/15/20.  he is going to return to the Medical Oncologist who 

started treatment.  High-dose methotrexate cannot be administered locally due to

lab turnaround on methotrexate levels.


Status: Acute   Priority: High   Code(s): C85.89 - OTH TYPES OF NON-HODG LYMPH, 

EXTRNOD AND SOLID ORGAN SITES   SNOMED Code(s): 830193500


   





(4) Hyponatremia


Narrative/Plan: 


Multifactorial, suspect possibly SIADH post frontal crainotomy.  Na+ stable











Status: Acute   Priority: High   Code(s): E87.1 - HYPO-OSMOLALITY AND 

HYPONATREMIA   SNOMED Code(s): 29989097


   


Plan: 





Ppoke with wife over the phone, we reviewed pt discharge medication list, MRSA 

infection, importance of completing antibiotics, need for patient to connect 

with PCP for treatment of type 2 diabetes.  She verbalized understanding, all 

question answered to her satisfaction


Time with Patient: Greater than 30 (counseling and coordinating care with wife 

over the phone)

## 2020-04-16 NOTE — P.DS
Providers


Date of admission: 


04/12/20 14:35





Expected date of discharge: 04/16/20


Attending physician: 


Mely Schneider





Consults: 





                                        





04/12/20 13:44


Consult Physician Urgent 


   Consulting Provider: Erwin Bryant


   Consult Reason/Comments: Non-Hodgkin's lymphoma


   Do you want consulting provider notified?: Yes


Consult Physician Urgent 


   Consulting Provider: Edi Magana


   Consult Reason/Comments: Elevated liver enzymes


   Do you want consulting provider notified?: Yes





04/12/20 17:43


Consult Physician Routine 


   Consulting Provider: Eric Roman


   Consult Reason/Comments: sepsis


   Do you want consulting provider notified?: Yes











Primary care physician: 


Physician Nonstaff





Hospital Course: 





Final diagnosis





Fever for evaluation, rule out sepsis, possible cholangitis


Acute urinary tract infection with MRSA


Increased AST, ALT, and alkaline phosphatase with possible hepatitis, rule out 

extrahepatic biliary obstruction


Elevated alkaline phosphatase and LDH


Elevated plasma lactic acid level


Hypomagnesemia


Diabetes mellitus type 2, uncontrolled, possibly aggravated by steroids


Hyponatremia


Elevated d-dimer without any evidence of pulmonary embolism


GERD


Hyperlipidemia


history of nicotine dependence


Covid 19 ruled out, testing was negative








Discharge disposition





Patient is being discharged in a stable condition with guarded prognosis to home

and will follow-up with his primary care provider in the outpatient setting.  

Patient will also follow-up with oncology upon discharge.  Patient will continue

on a short course of oral antibiotics in the form of Macrobid twice daily for 

the next 10 days.  Total time taken is 35 minutes.





History of present illness


This is a 60-year-old male who was recently admitted with fever and being 

evaluated for possible cholangitis and was also found to have elevated LFTs and 

was being closely monitored.  Multiple medical consultations following.  Patient

had a urinalysis with culture done growing MRSA with repeat urinalysis 

continuing to show moderate amounts of leukocyte Estrace.  Patient was 

persistent on going home and states he is feeling much better.  Infectious 

disease was following.  Patient was treated with IV antibiotics in the form of 

ceftriaxone along with vancomycin while hospitalized and will continue with 

Macrobid twice daily for the next 10 days in the outpatient setting due to the 

MRSA.  Instructed the patient that he needs follow-up with his primary care 

provider for repeat LFT functions along with monitoring the MRSA infection.  

Patient will also be following up with oncology in the outpatient setting for 

CNS lymphoma.  Patient does have a telehealth appointment with Dr. Mcelroy that 

was scheduled for this afternoon at 3:15.    Currently no reports of chest pain,

shortness of breath, or palpitations.  Patient is afebrile.  No reports of 

nausea or vomiting and patient is tolerating diet.





On exam vital signs are stable.  Temp is 97.5F, pulse is 60, respirations are 

15, blood pressure is 136/56, oxygen saturation is 96% on room air.  Cardio S1, 

S2 are present.  Respiratory system shows clear to auscultation.  Abdomen is 

soft and nontender.    Nervous system shows no focal deficits.





Please refer to medication reconciliation sheet for a list of medications.


Patient Condition at Discharge: Good





Plan - Discharge Summary


Discharge Rx Participant: No


New Discharge Prescriptions: 


New


   Nitrofurantoin Monohyd/M-Cryst [Macrobid] 100 mg PO Q12HR 10 Days #20 cap





Continue


   Allopurinol [Zyloprim] 300 mg PO DAILY


   Dexamethasone 4 mg PO DAILY


   Citalopram Hydrobromide [CeleXA] 20 mg PO DAILY


   Docusate Sodium [Dok] 100 mg PO BID





Discontinued


   QUEtiapine [SEROquel] 50 mg PO HS


   Lacosamide [Vimpat] 100 mg PO BID


Discharge Medication List





Allopurinol [Zyloprim] 300 mg PO DAILY 04/12/20 [History]


Citalopram Hydrobromide [CeleXA] 20 mg PO DAILY 04/12/20 [History]


Dexamethasone 4 mg PO DAILY 04/12/20 [History]


Docusate Sodium [Dok] 100 mg PO BID 04/12/20 [History]


Nitrofurantoin Monohyd/M-Cryst [Macrobid] 100 mg PO Q12HR 10 Days #20 cap 

04/15/20 [Rx]








Follow up Appointment(s)/Referral(s): 


Nonstaff,Physician [Primary Care Provider] - 1-2 days


Patient Instructions/Handouts:  MRSA (Methicillin-Resistant Staphylococcus 

Aureus) (DC), Urinary Tract Infection in Men (DC)


Activity/Diet/Wound Care/Special Instructions: 


Glucometer and testing supplies are in [facility] pharmacy. Patient 

medications are in the med room on [address].





Continue current diet


Follow-up with primary care provider


Follow-up with telehealth appointment today at 3:15 PM


Continue antibiotics until finished











Discharge Disposition: HOME SELF-CARE